# Patient Record
Sex: MALE | Race: BLACK OR AFRICAN AMERICAN | NOT HISPANIC OR LATINO | Employment: UNEMPLOYED | URBAN - NONMETROPOLITAN AREA
[De-identification: names, ages, dates, MRNs, and addresses within clinical notes are randomized per-mention and may not be internally consistent; named-entity substitution may affect disease eponyms.]

---

## 2022-03-10 ENCOUNTER — OFFICE VISIT (OUTPATIENT)
Dept: FAMILY MEDICINE | Facility: CLINIC | Age: 37
End: 2022-03-10
Payer: COMMERCIAL

## 2022-03-10 VITALS
BODY MASS INDEX: 27.28 KG/M2 | HEIGHT: 68 IN | OXYGEN SATURATION: 98 % | HEART RATE: 83 BPM | SYSTOLIC BLOOD PRESSURE: 126 MMHG | DIASTOLIC BLOOD PRESSURE: 70 MMHG | WEIGHT: 180 LBS | RESPIRATION RATE: 18 BRPM | TEMPERATURE: 100 F

## 2022-03-10 DIAGNOSIS — K08.89 TOOTH PAIN: ICD-10-CM

## 2022-03-10 DIAGNOSIS — K04.7 TOOTH ABSCESS: Primary | ICD-10-CM

## 2022-03-10 DIAGNOSIS — R05.9 COUGH: ICD-10-CM

## 2022-03-10 LAB
CTP QC/QA: YES
FLUAV AG NPH QL: NEGATIVE
FLUBV AG NPH QL: NEGATIVE
SARS-COV-2 AG RESP QL IA.RAPID: NEGATIVE

## 2022-03-10 PROCEDURE — 3074F PR MOST RECENT SYSTOLIC BLOOD PRESSURE < 130 MM HG: ICD-10-PCS | Mod: ,,, | Performed by: NURSE PRACTITIONER

## 2022-03-10 PROCEDURE — 1160F RVW MEDS BY RX/DR IN RCRD: CPT | Mod: ,,, | Performed by: NURSE PRACTITIONER

## 2022-03-10 PROCEDURE — 1160F PR REVIEW ALL MEDS BY PRESCRIBER/CLIN PHARMACIST DOCUMENTED: ICD-10-PCS | Mod: ,,, | Performed by: NURSE PRACTITIONER

## 2022-03-10 PROCEDURE — 87428 SARSCOV & INF VIR A&B AG IA: CPT | Mod: QW,,, | Performed by: NURSE PRACTITIONER

## 2022-03-10 PROCEDURE — 99213 OFFICE O/P EST LOW 20 MIN: CPT | Mod: ,,, | Performed by: NURSE PRACTITIONER

## 2022-03-10 PROCEDURE — 87428 POCT SARS-COV2 (COVID) WITH FLU ANTIGEN: ICD-10-PCS | Mod: QW,,, | Performed by: NURSE PRACTITIONER

## 2022-03-10 PROCEDURE — 1159F PR MEDICATION LIST DOCUMENTED IN MEDICAL RECORD: ICD-10-PCS | Mod: ,,, | Performed by: NURSE PRACTITIONER

## 2022-03-10 PROCEDURE — 3008F PR BODY MASS INDEX (BMI) DOCUMENTED: ICD-10-PCS | Mod: ,,, | Performed by: NURSE PRACTITIONER

## 2022-03-10 PROCEDURE — 3078F PR MOST RECENT DIASTOLIC BLOOD PRESSURE < 80 MM HG: ICD-10-PCS | Mod: ,,, | Performed by: NURSE PRACTITIONER

## 2022-03-10 PROCEDURE — 1159F MED LIST DOCD IN RCRD: CPT | Mod: ,,, | Performed by: NURSE PRACTITIONER

## 2022-03-10 PROCEDURE — 3008F BODY MASS INDEX DOCD: CPT | Mod: ,,, | Performed by: NURSE PRACTITIONER

## 2022-03-10 PROCEDURE — 3078F DIAST BP <80 MM HG: CPT | Mod: ,,, | Performed by: NURSE PRACTITIONER

## 2022-03-10 PROCEDURE — 3074F SYST BP LT 130 MM HG: CPT | Mod: ,,, | Performed by: NURSE PRACTITIONER

## 2022-03-10 PROCEDURE — 99213 PR OFFICE/OUTPT VISIT, EST, LEVL III, 20-29 MIN: ICD-10-PCS | Mod: ,,, | Performed by: NURSE PRACTITIONER

## 2022-03-10 RX ORDER — IBUPROFEN 800 MG/1
800 TABLET ORAL EVERY 6 HOURS PRN
Qty: 28 TABLET | Refills: 0 | Status: SHIPPED | OUTPATIENT
Start: 2022-03-10

## 2022-03-10 RX ORDER — CLINDAMYCIN HYDROCHLORIDE 300 MG/1
300 CAPSULE ORAL 3 TIMES DAILY
Qty: 21 CAPSULE | Refills: 0 | Status: SHIPPED | OUTPATIENT
Start: 2022-03-10

## 2022-03-10 RX ORDER — EMTRICITABINE AND TENOFOVIR DISOPROXIL FUMARATE 200; 300 MG/1; MG/1
TABLET, FILM COATED ORAL
COMMUNITY
Start: 2022-01-26

## 2022-03-10 NOTE — LETTER
March 10, 2022      [address]  [place] [address]  Phone: [phone]  Fax: [phone]       Patient: Farhat Wheeler   YOB: 1985  Date of Visit: 03/10/2022    To Whom It May Concern:    JOSE Wheeler  was at [facility] on 03/10/2022. He tested NEGATIVE for Covid-19. Mr. Wheeler may return to work/school on 03/10/2022 with no restrictions. If you have any questions or concerns, or if I can be of further assistance, please do not hesitate to contact me.    Sincerely,    SERGIO Jimenez

## 2022-03-11 NOTE — PROGRESS NOTES
"   SERGIO Lobato   [address] [address]  [phone]      PATIENT NAME: Farhat Wheeler  : 1985  DATE: 3/10/22  MRN: [id number]      Billing Provider: SERGIO Lobato  Level of Service:   Patient PCP Information     Provider PCP Type    Virgie Cormier MD General          Reason for Visit / Chief Complaint: Cough and Fever (Low grade, work sent him to be tested. )       Update PCP  Update Chief Complaint         History of Present Illness / Problem Focused Workflow     36 year old male presents to clinic after being sent home from work last night with low grade temp   He denies any congestion, otalgia, body aches  Reports he has had some "toothe pain" for about 2 days  Pt is reluctant to give hx or complaints during initial assessment  Reports he is "immunocomprimised" but will not give reasons related      Review of Systems     Review of Systems   Constitutional: Positive for fever. Negative for diaphoresis and fatigue.   HENT: Positive for dental problem. Negative for congestion and sore throat.    Eyes: Negative for visual disturbance.   Respiratory: Negative for cough and shortness of breath.    Cardiovascular: Negative for chest pain.   Gastrointestinal: Negative for abdominal pain, diarrhea and nausea.   Genitourinary: Negative for dysuria.   Musculoskeletal: Negative for gait problem.   Allergic/Immunologic: Negative for environmental allergies.   Neurological: Negative for dizziness, weakness and headaches.   Psychiatric/Behavioral: Negative for dysphoric mood. The patient is not nervous/anxious.        Medical / Social / Family History     Past Medical History:   Diagnosis Date    HIV infection        History reviewed. No pertinent surgical history.    Social History    reports that he has been smoking. He has never used smokeless tobacco. He reports current alcohol use.    Family History  's family history is " not on file.    Medications and Allergies     Medications  Outpatient Medications Marked as Taking for the 3/10/22 encounter (Office Visit) with SERGIO Lobato   Medication Sig Dispense Refill    czwwvynhv-hvhaibvg-vjopftj ala (BIKTARVY) -25 mg (25 kg or greater) Take 1 tablet by mouth once daily.      emtricitabine-tenofovir 200-300 mg (TRUVADA) 200-300 mg Tab 1 tablet         Allergies  Review of patient's allergies indicates:  No Known Allergies    Physical Examination     Vitals:    03/10/22 1516   BP: 126/70   Pulse: 83   Resp: 18   Temp: 99.6 °F (37.6 °C)     Physical Exam  Constitutional:       General: He is not in acute distress.  HENT:      Head: Normocephalic.      Right Ear: Tympanic membrane normal.      Left Ear: Tympanic membrane normal.      Nose: Nose normal. No congestion.      Mouth/Throat:      Mouth: Mucous membranes are moist.      Pharynx: Posterior oropharyngeal erythema (left lower gumline near molar tooth  ) present.      Comments: Redness and swelling to left lower gumline near molar tooth  Eyes:      Extraocular Movements: Extraocular movements intact.   Cardiovascular:      Rate and Rhythm: Normal rate.   Pulmonary:      Effort: Pulmonary effort is normal. No respiratory distress.   Abdominal:      General: Bowel sounds are normal.      Palpations: Abdomen is soft.   Musculoskeletal:         General: Normal range of motion.      Cervical back: Neck supple.   Skin:     General: Skin is warm.   Neurological:      Mental Status: He is alert and oriented to person, place, and time.   Psychiatric:         Behavior: Behavior normal.           Imaging / Labs     Office Visit on 03/10/2022   Component Date Value Ref Range Status    SARS Coronavirus 2 Antigen 03/10/2022 Negative  Negative Final    Rapid Influenza A Ag 03/10/2022 Negative  Negative Final    Rapid Influenza B Ag 03/10/2022 Negative  Negative Final     Acceptable 03/10/2022 Yes   Final     No image  results found.      Assessment and Plan (including Health Maintenance)      Problem List  Smart Sets  Document Outside HM   :    Health Maintenance Due   Topic Date Due    Hepatitis C Screening  Never done    COVID-19 Vaccine (1) Never done    HIV Screening  Never done    TETANUS VACCINE  Never done    Pneumococcal Vaccines (Age 0-64) (2 of 4 - PPSV23) 04/06/2022       Problem List Items Addressed This Visit        ENT    Tooth abscess - Primary    Relevant Medications    clindamycin (CLEOCIN) 300 MG capsule    Other Relevant Orders    Ambulatory referral/consult to Dentistry    Tooth pain    Relevant Medications    ibuprofen (ADVIL,MOTRIN) 800 MG tablet      Other Visit Diagnoses     Cough        Relevant Orders    POCT SARS-COV2 (COVID) with Flu Antigen (Completed)          Health Maintenance Topics with due status: Not Due       Topic Last Completion Date    Lipid Panel 02/09/2022       Covid testing negative. Will treat for tooth abscess to molar tooth of left lower jaw  Referred to DMD for possible extraction. Follow up as needed  Pt voiced understanding and agreement  Excuse for work given      Signature:  SERGIO Lobato  [address] [address]  [phone]    Date of encounter: 3/10/22

## 2022-05-31 ENCOUNTER — HOSPITAL ENCOUNTER (EMERGENCY)
Facility: HOSPITAL | Age: 37
Discharge: HOME | End: 2022-05-31
Payer: COMMERCIAL

## 2022-05-31 VITALS
TEMPERATURE: 99 F | RESPIRATION RATE: 16 BRPM | SYSTOLIC BLOOD PRESSURE: 128 MMHG | DIASTOLIC BLOOD PRESSURE: 88 MMHG | WEIGHT: 186 LBS | OXYGEN SATURATION: 98 % | BODY MASS INDEX: 28.28 KG/M2 | HEART RATE: 91 BPM

## 2022-05-31 DIAGNOSIS — R11.2 NON-INTRACTABLE VOMITING WITH NAUSEA, UNSPECIFIED VOMITING TYPE: Primary | ICD-10-CM

## 2022-05-31 PROCEDURE — 96372 THER/PROPH/DIAG INJ SC/IM: CPT

## 2022-05-31 PROCEDURE — 63600175 PHARM REV CODE 636 W HCPCS

## 2022-05-31 PROCEDURE — 99283 EMERGENCY DEPT VISIT LOW MDM: CPT | Mod: ,,,

## 2022-05-31 PROCEDURE — 99283 PR EMERGENCY DEPT VISIT,LEVEL III: ICD-10-PCS | Mod: ,,,

## 2022-05-31 PROCEDURE — 99284 EMERGENCY DEPT VISIT MOD MDM: CPT

## 2022-05-31 RX ORDER — ONDANSETRON 4 MG/1
4 TABLET, FILM COATED ORAL EVERY 6 HOURS
Qty: 12 TABLET | Refills: 0 | Status: SHIPPED | OUTPATIENT
Start: 2022-05-31

## 2022-05-31 RX ORDER — ONDANSETRON 2 MG/ML
4 INJECTION INTRAMUSCULAR; INTRAVENOUS
Status: COMPLETED | OUTPATIENT
Start: 2022-05-31 | End: 2022-05-31

## 2022-05-31 RX ADMIN — ONDANSETRON 4 MG: 2 INJECTION INTRAMUSCULAR; INTRAVENOUS at 10:05

## 2022-05-31 NOTE — Clinical Note
"Farhat Bowen (JUNIOR)field was seen and treated in our emergency department on 5/31/2022.  He may return to work on 06/01/2022.       If you have any questions or concerns, please don't hesitate to call.      SERGIO Gibson"

## 2022-06-01 NOTE — DISCHARGE INSTRUCTIONS
Take medication as directed by the label, drink plenty of water, follow up with pcp as needed, return to the ED for chest pain, shortness of breath, and fever.

## 2022-06-01 NOTE — ED PROVIDER NOTES
Encounter Date: 5/31/2022       History     Chief Complaint   Patient presents with    Vomiting     Mr. Wheeler is a 37 y/o AAM who presents to the Emergency Department with c/o vomiting that started yesterday and exposure to ill contacts at a birthday party. Patient stated that his nausea is better. Patient denies any chest pains, shortness of breath, and fever.     The history is provided by the patient.   Vomiting   This is a recurrent problem. The current episode started yesterday. The problem occurs 2 - 4 times per day. The problem has been resolved. The emesis has an appearance of stomach contents. Pertinent negatives include no abdominal pain, no arthralgias, no chills, no cough, no diarrhea, no fever, no headaches, no myalgias, no sweats and no URI. Risk factors include ill contacts.     Review of patient's allergies indicates:  No Known Allergies  Past Medical History:   Diagnosis Date    HIV infection      History reviewed. No pertinent surgical history.  No family history on file.  Social History     Tobacco Use    Smoking status: Current Every Day Smoker    Smokeless tobacco: Never Used   Substance Use Topics    Alcohol use: Yes     Review of Systems   Constitutional: Negative for chills and fever.   HENT: Negative for sore throat.    Respiratory: Negative for cough and shortness of breath.    Cardiovascular: Negative for chest pain.   Gastrointestinal: Positive for nausea and vomiting. Negative for abdominal pain and diarrhea.   Genitourinary: Negative for dysuria.   Musculoskeletal: Negative for arthralgias, back pain and myalgias.   Skin: Negative for rash.   Neurological: Negative for weakness and headaches.   Hematological: Does not bruise/bleed easily.       Physical Exam     Initial Vitals [05/31/22 2134]   BP Pulse Resp Temp SpO2   128/88 91 16 99.2 °F (37.3 °C) 98 %      MAP       --         Physical Exam    Constitutional: Vital signs are normal. He appears well-developed and  well-nourished. He is not diaphoretic. He is cooperative.  Non-toxic appearance. He does not have a sickly appearance. He does not appear ill. No distress.   HENT:   Head: Normocephalic and atraumatic.   Eyes: Conjunctivae, EOM and lids are normal. Pupils are equal, round, and reactive to light.   Neck: Trachea normal. Neck supple.   Normal range of motion.  Cardiovascular: Normal rate, regular rhythm, S1 normal, S2 normal, normal heart sounds and normal pulses. Exam reveals no friction rub.    No murmur heard.  Pulmonary/Chest: Effort normal and breath sounds normal. He has no decreased breath sounds. He has no wheezes. He has no rhonchi. He has no rales.   Abdominal: Abdomen is soft and flat. Bowel sounds are normal. There is no abdominal tenderness.   Musculoskeletal:      Cervical back: Normal range of motion and neck supple.     Lymphadenopathy:     He has no cervical adenopathy.   Neurological: He is alert and oriented to person, place, and time. He has normal strength. No cranial nerve deficit or sensory deficit. GCS eye subscore is 4. GCS verbal subscore is 5. GCS motor subscore is 6.   Skin: Skin is warm, dry and intact. Capillary refill takes less than 2 seconds. No rash noted.   Psychiatric: He has a normal mood and affect. His speech is normal and behavior is normal. Judgment and thought content normal. Cognition and memory are normal.         Medical Screening Exam   See Full Note    ED Course   Procedures  Labs Reviewed - No data to display       Imaging Results    None          Medications   ondansetron injection 4 mg (4 mg Intramuscular Given 5/31/22 2200)                       Clinical Impression:   Final diagnoses:  [R11.2] Non-intractable vomiting with nausea, unspecified vomiting type (Primary)          ED Disposition Condition    Discharge Stable        ED Prescriptions     Medication Sig Dispense Start Date End Date Auth. Provider    ondansetron (ZOFRAN) 4 MG tablet Take 1 tablet (4 mg total)  by mouth every 6 (six) hours. 12 tablet 5/31/2022  SERGIO Gibson        Follow-up Information     Follow up With Specialties Details Why Contact Info    Virgie Cormier MD Family Medicine  As needed, If symptoms worsen [address] [place] [address] [address] [place] [address]  [phone]             SERGIO Gibson  05/31/22 3110

## 2022-06-01 NOTE — ED TRIAGE NOTES
Pt comes to the ED per pov with complaint of nausea, vomiting, diarrhea x 2 days.  He states he was with children at a party 2 days ago and they all became ill with the same symptoms as well but he left them yesterday and doesn't know if they are still ill or if they were treated for the symptoms.  He states he vomited x 2 today and diarrhea x 4 times but yesterday was TNTC.  He denies any change in his urine output or of having dysuria.  He states he actually feels better today than yesterday and has eaten one time and not vomited that.  He is also taking liquids without vomiting today.